# Patient Record
Sex: MALE | Race: BLACK OR AFRICAN AMERICAN | NOT HISPANIC OR LATINO | URBAN - METROPOLITAN AREA
[De-identification: names, ages, dates, MRNs, and addresses within clinical notes are randomized per-mention and may not be internally consistent; named-entity substitution may affect disease eponyms.]

---

## 2021-10-09 ENCOUNTER — EMERGENCY (EMERGENCY)
Facility: HOSPITAL | Age: 26
LOS: 1 days | Discharge: HOME | End: 2021-10-09
Attending: PERSONAL EMERGENCY RESPONSE ATTENDANT | Admitting: PERSONAL EMERGENCY RESPONSE ATTENDANT
Payer: COMMERCIAL

## 2021-10-09 VITALS
DIASTOLIC BLOOD PRESSURE: 75 MMHG | SYSTOLIC BLOOD PRESSURE: 108 MMHG | HEART RATE: 70 BPM | TEMPERATURE: 98 F | OXYGEN SATURATION: 100 % | RESPIRATION RATE: 16 BRPM

## 2021-10-09 VITALS
HEART RATE: 60 BPM | DIASTOLIC BLOOD PRESSURE: 59 MMHG | SYSTOLIC BLOOD PRESSURE: 100 MMHG | TEMPERATURE: 98 F | OXYGEN SATURATION: 100 % | RESPIRATION RATE: 16 BRPM

## 2021-10-09 LAB — HIV 1+2 AB+HIV1 P24 AG SERPL QL IA: SIGNIFICANT CHANGE UP

## 2021-10-09 PROCEDURE — 10060 I&D ABSCESS SIMPLE/SINGLE: CPT

## 2021-10-09 PROCEDURE — 99284 EMERGENCY DEPT VISIT MOD MDM: CPT | Mod: 25

## 2021-10-09 RX ORDER — IBUPROFEN 200 MG
600 TABLET ORAL ONCE
Refills: 0 | Status: COMPLETED | OUTPATIENT
Start: 2021-10-09 | End: 2021-10-09

## 2021-10-09 RX ADMIN — Medication 600 MILLIGRAM(S): at 11:34

## 2021-10-09 RX ADMIN — Medication 100 MILLIGRAM(S): at 11:34

## 2021-10-09 NOTE — ED ADULT NURSE NOTE - CHIEF COMPLAINT QUOTE
Pt sent by [facility] [place] with R groin pain/swelling x 2 days.  Denies injury, dysuria, chills

## 2021-10-09 NOTE — ED ADULT TRIAGE NOTE - CHIEF COMPLAINT QUOTE
Pt sent by [facility] [place] with R groin pain/swelling x 2 days.  Denies injury, dysuria, chills

## 2021-10-09 NOTE — ED PROVIDER NOTE - ATTENDING CONTRIBUTION TO CARE
Attending MD Godfrey.  Agree with above.  PT is a 25 yo male with no sig pmhx except remote gonorrhea infection that is s/p treatment.  Pt is sexually active with women, has had unprotected sex recently.  Deneis drainage from penis.  Presnts to ED with area of induration, erythema and pain to R inguinal region.  States that he shaves/trims genital hair and developed a small area of erythema/swelling several days ago at this site and it has now become increasingly painful and swollen.  PT denies fevers/chills.  Able to pass gas/have bowel movements.  Bedside sono placed on area of indurated re: inguinal fold c/w abscess with large pocket ~2cm diameter drainable fluid collection.  Please see procedure note for details of this procedure.  PT tolerated procedure well.  No abdominal TTP.  No TTP of scrotum, R testicle or spermatocord.  Pt amenable to HIV/GC/Chlam screening.  These have been sent.  PT made aware that GC/chlam will not result until after discharge and he will be called if these are positive.  Abx administered in ED and pt d/c'd with course 2/2 firm induration surrounding site c/w poss cellulitis additionally.  Pt educated re: need to keep area clean, dry and follow-up in 2-3 days for reassessment.  Return to ED immediately for development of fevers/chills/n/v/inability to tolerate PO.

## 2021-10-09 NOTE — ED PROVIDER NOTE - PHYSICAL EXAMINATION
Callie Dhiloln MD  GENERAL: Patient awake alert NAD.  HEENT: NC/AT, Moist mucous membranes, EOMI.  LUNGS: normal work of breathing  ABDOMEN: Soft, NT, ND, No rebound, guarding. No CVA tenderness.   : swollen R groin below inguinal ligament, hard to palpation with minimal fluctuance, tender. no testicular or penile tenderness or skin changes  EXT: No edema.   MSK: no pain with movement, no deformities.  NEURO: A&Ox3. Moving all extremities.  SKIN: Warm and dry. No rash.  PSYCH: Normal affect.

## 2021-10-09 NOTE — ED PROVIDER NOTE - NSFOLLOWUPINSTRUCTIONS_ED_ALL_ED_FT
You were seen in the ED for an abscess. We did an incision and drainage in the ED, meaning we numbed the skin with lidocaine and cut the abscess open. Please keep the area dry for 48 hrs. Change the bandage as needed. It may continue to drain some pus. Please follow up with your primary doctor in 2-3 days. Return to the ED if you experience any worsening or new symptoms or any symptoms that concern you, including fevers, chills, increased swelling, numbness.    For pain, please take 650mg Tylenol every 6 hours as needed or 600mg ibuprofen every 8 hours as needed. Always take ibuprofen with food. You make take both Tylenol and ibuprofen as described above if one medication is not enough for your pain.    We sent the antibiotic doxycycline to your pharmacy. Please take 1 tab twice a day for 7 days.

## 2021-10-09 NOTE — ED PROVIDER NOTE - CLINICAL SUMMARY MEDICAL DECISION MAKING FREE TEXT BOX
Alejo - Pt is a 24 yo M who presents with R groin pain. suspect abscess. Exam not consistent with inguinal hernia or lymphadenopathy. Will I & D. check HIV, chlamydia, gonorrhea

## 2021-10-09 NOTE — ED PROVIDER NOTE - OBJECTIVE STATEMENT
Pt is a 24 yo M who presents with R groin pain. Started a few days ago, progressively more swollen and painful. Nicked himself while shaving groin a few days ago. No discharge from penis, no testicle pain, no ulcers. Denies fevers, chills, N/V, SOB, CP. Is sexually active with women.

## 2021-10-11 LAB
C TRACH RRNA SPEC QL NAA+PROBE: SIGNIFICANT CHANGE UP
SPECIMEN SOURCE: SIGNIFICANT CHANGE UP

## 2023-05-02 NOTE — ED PROVIDER NOTE - PATIENT PORTAL LINK FT
Health Maintenance Due   Topic Date Due   • DM/CKD Microalbumin  08/12/2021   • Medicare Advantage- Medicare Wellness Visit  01/01/2023   • DM/CKD GFR  03/11/2023   • Breast Cancer Screening  05/06/2023       Patient is due for topics as listed above but is not proceeding with DM/CKD Microalbumin, Glomerular Filtration Rate (GFR), Mammogram and MWV (Medicare Wellness Visit) at this time.    You can access the FollowMyHealth Patient Portal offered by [facility] by registering at the following website: [email or web address]. By joining [employer]’s FollowMyHealth portal, you will also be able to view your health information using other applications (apps) compatible with our system.